# Patient Record
Sex: FEMALE | HISPANIC OR LATINO | URBAN - METROPOLITAN AREA
[De-identification: names, ages, dates, MRNs, and addresses within clinical notes are randomized per-mention and may not be internally consistent; named-entity substitution may affect disease eponyms.]

---

## 2019-10-25 ENCOUNTER — OFFICE VISIT (OUTPATIENT)
Dept: ORTHOPEDIC SURGERY | Facility: CLINIC | Age: 59
End: 2019-10-25

## 2019-10-25 VITALS — HEIGHT: 60 IN | BODY MASS INDEX: 27.48 KG/M2 | WEIGHT: 140 LBS | TEMPERATURE: 97.7 F

## 2019-10-25 DIAGNOSIS — G56.03 BILATERAL CARPAL TUNNEL SYNDROME: ICD-10-CM

## 2019-10-25 DIAGNOSIS — M79.641 PAIN IN BOTH HANDS: Primary | ICD-10-CM

## 2019-10-25 DIAGNOSIS — M79.642 PAIN IN BOTH HANDS: Primary | ICD-10-CM

## 2019-10-25 PROCEDURE — 99203 OFFICE O/P NEW LOW 30 MIN: CPT | Performed by: ORTHOPAEDIC SURGERY

## 2019-10-25 RX ORDER — TRAMADOL HYDROCHLORIDE 50 MG/1
50 TABLET ORAL EVERY 4 HOURS PRN
Qty: 60 TABLET | Refills: 0 | Status: SHIPPED | OUTPATIENT
Start: 2019-10-25

## 2019-10-27 NOTE — PROGRESS NOTES
Patient: Daily Murphy    YOB: 1960    Medical Record Number: [id number]    Chief Complaints:  Bilateral hand pain and numbness    History of Present Illness:     58 y.o. female patient who presents for evaluation of both hands.  Her history is obtained with the help of an , Stephanie, who works as an MA here in our office.  This was done with the patient's consent.  She reports a long history of gradual onset pain and intermittent numbness.  Denies any discrete precipitating event or factor.  The pain as moderate, constant, and aching.  Pain is worse at night.  She has trouble sleeping.  She reports that her symptoms have progressed to the point where she now has constant numbness in both hands.  She does report some associated cramping.  The left is somewhat worse than the right but both bother her.  She is right-hand dominant.  Rest and anti-inflammatories have helped somewhat.  Denies any other associated complaints or issues.    Allergies: No Known Allergies    Home Medications:      Current Outpatient Medications:   •  traMADol (ULTRAM) 50 MG tablet, Take 1 tablet by mouth Every 4 (Four) Hours As Needed for Moderate Pain ., Disp: 60 tablet, Rfl: 0    Past Medical History:   Diagnosis Date   • Gastritis    • Thoracic stomach hernia        Past Surgical History:   Procedure Laterality Date   •  SECTION     • KNEE SURGERY         Social History     Occupational History   • Not on file   Tobacco Use   • Smoking status: Never Smoker   • Smokeless tobacco: Never Used   Substance and Sexual Activity   • Alcohol use: No     Frequency: Never   • Drug use: Not on file   • Sexual activity: Not on file      Social History     Social History Narrative   • Not on file       Family History   Problem Relation Age of Onset   • Hypertension Mother    • Heart disease Mother        Review of Systems:      Constitutional: Denies fever, shaking or chills   Eyes: Denies change in visual acuity  "  HEENT: Denies nasal congestion or sore throat   Respiratory: Denies cough or shortness of breath   Cardiovascular: Denies chest pain or edema  Endocrine: Denies tremors, palpitations, intolerance of heat or cold, polyuria, polydipsia.  GI: Denies abdominal pain, nausea, vomiting, bloody stools or diarrhea  : Denies frequency, urgency, incontinence, retention, or nocturia.  Musculoskeletal: Denies numbness tingling or loss of motor function except as above  Integument: Denies rash, lesion or ulceration   Neurologic: Denies headache or focal weakness, deficits  Heme: Denies epistaxis, spontaneous or excessive bleeding, epistaxis, hematuria, melena, fatigue, enlarged or tender lymph nodes.      All other pertinent positives and negatives as noted above in HPI.      Physical Exam: 58 y.o. female    Vitals:    10/25/19 0959   Temp: 97.7 °F (36.5 °C)   TempSrc: Temporal   Weight: 63.5 kg (140 lb)   Height: 152.4 cm (60\")       General:  Patient is awake and alert.  Appears in no acute distress or discomfort.    Psych:  Affect and demeanor are appropriate.    Eyes:  Conjunctiva and sclera appear grossly normal.  Eyes track well and EOM seem to be intact.    Ears:  No gross abnormalities.  Hearing adequate for the exam.    Cardiovascular:  Regular rate and rhythm.    Lungs:  Good chest expansion.  Breathing unlabored.    Lymph:  No palpable masses or adenopathy     Extremities: Bilateral upper extremities are examined.  Her exam is symmetric.  Skin is benign about the hand and wrist.  No atrophy, swelling, or masses.  No palpable adenopathy in the extremity.  No focal areas of exquisite tenderness.  Positive Phalen's maneuver over the carpal tunnel.  No evident instability of the wrist or thumb.  Good , pinch, finger and thumb abduction strength.  Intact sensation throughout the hands.  Palpable radial pulses.  Brisk capillary refill.  Good skin turgor.         Radiology:   AP, oblique, and lateral views of both " hands are ordered and reviewed.  No comparison films are immediately available.  There are no lesions, masses, profound arthrosis, or other concerning findings to account for the patient's symptoms.    Assessment/Plan:  Bilateral carpal tunnel syndrome    She reports constant numbness which is concerning.  I recommend that we get nerve test to better evaluate the severity of her condition.  In the meantime, I fitted her with braces for both hands.  I also agreed to give her a limited prescription for tramadol for her night pain.  Risks of the medicine were discussed.  I will see her back to discuss the nerve test results and further options.    Steve Negron MD    10/25/2019

## 2019-11-15 ENCOUNTER — APPOINTMENT (OUTPATIENT)
Dept: INFUSION THERAPY | Facility: HOSPITAL | Age: 59
End: 2019-11-15